# Patient Record
Sex: MALE | Race: WHITE
[De-identification: names, ages, dates, MRNs, and addresses within clinical notes are randomized per-mention and may not be internally consistent; named-entity substitution may affect disease eponyms.]

---

## 2020-12-01 ENCOUNTER — HOSPITAL ENCOUNTER (OUTPATIENT)
Dept: HOSPITAL 96 - M.RAD | Age: 75
End: 2020-12-01
Attending: NURSE PRACTITIONER
Payer: COMMERCIAL

## 2020-12-01 DIAGNOSIS — R05: ICD-10-CM

## 2020-12-01 DIAGNOSIS — R06.02: ICD-10-CM

## 2020-12-01 DIAGNOSIS — J84.9: Primary | ICD-10-CM

## 2021-01-14 ENCOUNTER — HOSPITAL ENCOUNTER (OUTPATIENT)
Dept: HOSPITAL 96 - M.PUL | Age: 76
End: 2021-01-14
Attending: NURSE PRACTITIONER
Payer: COMMERCIAL

## 2021-01-14 DIAGNOSIS — R06.02: ICD-10-CM

## 2021-01-14 DIAGNOSIS — J98.4: Primary | ICD-10-CM

## 2021-01-14 DIAGNOSIS — Z87.891: ICD-10-CM

## 2021-01-24 NOTE — PF
[address]  [address]                    PULMONARY FUNCTION REPORT     
_______________________________________________________________________________
 
Name:       PENFIELD,DANNY M               Room:                      REG CLI 
M.R.#:  [id number]      Account #:      [id number]  
Admission:  01/14/21     Attend Phys:    KRYSTEN Romero 
Discharge:               Date of Birth:  [birth year]  
         Report #: [id number]
                                                                     [id number]  
_______________________________________________________________________________
THIS REPORT FOR:  
 
cc:  Mark Dupont MD, Bruce D. MD                                                   ~
     Jean Antunez MD               
DATE OF SERVICE:  01/14/2021
 
 
The FEV1/FVC ratio is decreased to 52% with an FVC decreased to 72% and FEV1
decreased to 52% as well.  The GYC30-06 is decreased to 29%.  After the
administration of a bronchodilator, there is no significant change in any of
these values.  The patient's post-bronchodilator FEV1 is noted to be 1.51
liters.  The total lung capacity is decreased to 74% with a residual volume
decreased to 51%.  The DLCO as adjusted for hemoglobin is decreased to 59%.  The
flow volume loop is concave upwards.
 
IMPRESSION:
1.  Moderate obstruction without evidence of reversibility.
2.  Mild restriction.
3.  The DLCO as adjusted for hemoglobin decreased to 59%.
 
 
 
 
 
 
 
 
 
 
 
 
 
 
 
 
 
 
 
 
 
 
 
 
<ELECTRONICALLY SIGNED>
                                        By:  Jean Antunez MD              
01/24/21     2209
D: 01/24/21 1749_______________________________________
T: 01/24/21 [id number] Antunez MD                 /nt

## 2021-04-22 ENCOUNTER — HOSPITAL ENCOUNTER (OUTPATIENT)
Dept: HOSPITAL 96 - M.LAB | Age: 76
End: 2021-04-22
Attending: NURSE PRACTITIONER
Payer: COMMERCIAL

## 2021-04-22 DIAGNOSIS — I25.10: ICD-10-CM

## 2021-04-22 DIAGNOSIS — I65.23: Primary | ICD-10-CM

## 2021-04-22 DIAGNOSIS — I10: ICD-10-CM

## 2021-04-22 LAB
BUN SERPL-MCNC: 30 MG/DL (ref 7–18)
CREAT SERPL-MCNC: 1 MG/DL (ref 0.6–1.3)